# Patient Record
Sex: MALE | Race: ASIAN | URBAN - METROPOLITAN AREA
[De-identification: names, ages, dates, MRNs, and addresses within clinical notes are randomized per-mention and may not be internally consistent; named-entity substitution may affect disease eponyms.]

---

## 2019-11-06 ENCOUNTER — OFFICE VISIT (OUTPATIENT)
Dept: PRIMARY CARE CLINIC | Age: 34
End: 2019-11-06

## 2019-11-06 VITALS
HEART RATE: 65 BPM | DIASTOLIC BLOOD PRESSURE: 100 MMHG | RESPIRATION RATE: 16 BRPM | BODY MASS INDEX: 28.06 KG/M2 | HEIGHT: 67 IN | TEMPERATURE: 98.3 F | SYSTOLIC BLOOD PRESSURE: 158 MMHG | WEIGHT: 178.8 LBS | OXYGEN SATURATION: 99 %

## 2019-11-06 DIAGNOSIS — I10 ESSENTIAL HYPERTENSION: ICD-10-CM

## 2019-11-06 DIAGNOSIS — E78.00 HYPERCHOLESTEREMIA: Primary | ICD-10-CM

## 2019-11-06 RX ORDER — ATORVASTATIN CALCIUM 20 MG/1
20 TABLET, FILM COATED ORAL DAILY
Qty: 90 TAB | Refills: 3 | Status: SHIPPED | OUTPATIENT
Start: 2019-11-06

## 2019-11-06 RX ORDER — ATORVASTATIN CALCIUM 20 MG/1
TABLET, FILM COATED ORAL DAILY
COMMUNITY
End: 2019-11-06 | Stop reason: SDUPTHER

## 2019-11-06 NOTE — PROGRESS NOTES
Jose Alberto Rivera is a 37 y.o. male    Chief Complaint   Patient presents with    [facility]    Medication Refill     atorvastatin- has not taken it in over a month    Cholesterol Problem     not fasting       1. Have you been to the ER, urgent care clinic since your last visit? Hospitalized since your last visit? No    2. Have you seen or consulted any other health care providers outside of the [address] [place] [address] since your last visit? Include any pap smears or colon screening. No    No flowsheet data found.      Health Maintenance Due   Topic Date Due    DTaP/Tdap/Td series (1 - Tdap) 09/01/2004    Influenza Age 5 to Adult  08/01/2019

## 2019-11-06 NOTE — PROGRESS NOTES
Jose Alberto Rivera is a 37 y.o.  male and presents with     Chief Complaint   Patient presents with    Establish Care    Medication Refill     atorvastatin- has not taken it in over a month    Cholesterol Problem     not fasting    Hypertension     Pt is here to establish care. Pt recently moved from [place]. t started taking statins a year ago and was also taking HTN meds . However Blood pressure improved so he stopped taking it. Both parents have HTN and DM. No CAD or stroke. Pt does not smoke or drink alcohol. Pt ran out of statins 2 months back. .  Pt took BP meds for 2 months and stopped it  8 months back. Pt is  and has one child. Aunt had uterine cancer. Past Medical History:   Diagnosis Date    Hypercholesterolemia      History reviewed. No pertinent surgical history. Current Outpatient Medications   Medication Sig    atorvastatin (LIPITOR) 20 mg tablet Take 1 Tab by mouth daily. Indications: has not taken it in over a month 11/6/19-mjb     No current facility-administered medications for this visit. Health Maintenance   Topic Date Due    DTaP/Tdap/Td series (1 - Tdap) 09/01/2004    Influenza Age 5 to Adult  08/01/2019    Pneumococcal 0-64 years  Aged Out       There is no immunization history on file for this patient. No LMP for male patient. Allergies and Intolerances:   No Known Allergies    Family History:   History reviewed. No pertinent family history. Social History:   He  reports that he has never smoked. He has never used smokeless tobacco.  He  reports that he drank alcohol.             Review of Systems:   General: negative for - chills, fatigue, fever, weight change  Psych: negative for - anxiety, depression, irritability or mood swings  ENT: negative for - headaches, hearing change, nasal congestion, oral lesions, sneezing or sore throat  Heme/ Lymph: negative for - bleeding problems, bruising, pallor or swollen lymph nodes  Endo: negative for - hot flashes, polydipsia/polyuria or temperature intolerance  Resp: negative for - cough, shortness of breath or wheezing  CV: negative for - chest pain, edema or palpitations  GI: negative for - abdominal pain, change in bowel habits, constipation, diarrhea or nausea/vomiting  : negative for - dysuria, hematuria, incontinence, pelvic pain or vulvar/vaginal symptoms  MSK: negative for - joint pain, joint swelling or muscle pain  Neuro: negative for - confusion, headaches, seizures or weakness  Derm: negative for - dry skin, hair changes, rash or skin lesion changes          Physical:   Vitals:   Vitals:    11/06/19 1256   BP: 127/89   Pulse: 65   Resp: 16   Temp: 98.3 °F (36.8 °C)   TempSrc: Oral   SpO2: 99%   Weight: 178 lb 12.8 oz (81.1 kg)   Height: 5' 7\" (1.702 m)           Exam:   HEENT- atraumatic,normocephalic, awake, oriented, well nourished  Neck - supple,no enlarged lymph nodes, no JVD, no thyromegaly  Chest- CTA, no rhonchi, no crackles  Heart- rrr, no murmurs / gallop/rub  Abdomen- soft,BS+,NT, no hepatosplenomegaly  Ext - no c/c/edema   Neuro- no focal deficits. Power 5/5 all extremities  Skin - warm,dry, no obvious rashes. Review of Data:   LABS:   No results found for: WBC, HGB, HCT, PLT, HGBEXT, HCTEXT, PLTEXT, HGBEXT, HCTEXT, PLTEXT  No results found for: NA, K, CL, CO2, GLU, BUN, CREA  No results found for: CHOL, CHOLX, CHLST, CHOLV, HDL, HDLP, LDL, LDLC, DLDLP, TGLX, TRIGL, TRIGP  No results found for: GPT        Impression / Plan:        ICD-10-CM ICD-9-CM    1. Hypercholesteremia E78.00 272.0 CBC WITH AUTOMATED DIFF      METABOLIC PANEL, COMPREHENSIVE      LIPID PANEL   2. Essential hypertension I10 401.9      HTN - low salt diet, monitor blood pressure. Explained to patient risk benefits of the medications. Advised patient to stop meds if having any side effects. Pt verbalized understanding of the instructions.     I have discussed the diagnosis with the patient and the intended plan as seen in the above orders. The patient has received an after-visit summary and questions were answered concerning future plans. I have discussed medication side effects and warnings with the patient as well. I have reviewed the plan of care with the patient, accepted their input and they are in agreement with the treatment goals. Reviewed plan of care. Patient has provided input and agrees with goals. Follow-up and Dispositions    · Return in about 6 months (around 5/6/2020).          Kim Lion MD

## 2020-01-09 ENCOUNTER — TELEPHONE (OUTPATIENT)
Dept: PRIMARY CARE CLINIC | Age: 35
End: 2020-01-09

## 2020-01-09 LAB
ALBUMIN SERPL-MCNC: 4.5 G/DL (ref 3.5–5.5)
ALBUMIN/GLOB SERPL: 1.4 {RATIO} (ref 1.2–2.2)
ALP SERPL-CCNC: 98 IU/L (ref 39–117)
ALT SERPL-CCNC: 54 IU/L (ref 0–44)
AST SERPL-CCNC: 30 IU/L (ref 0–40)
BASOPHILS # BLD AUTO: 0.1 X10E3/UL (ref 0–0.2)
BASOPHILS NFR BLD AUTO: 1 %
BILIRUB SERPL-MCNC: 0.6 MG/DL (ref 0–1.2)
BUN SERPL-MCNC: 10 MG/DL (ref 6–20)
BUN/CREAT SERPL: 12 (ref 9–20)
CALCIUM SERPL-MCNC: 9.6 MG/DL (ref 8.7–10.2)
CHLORIDE SERPL-SCNC: 101 MMOL/L (ref 96–106)
CHOLEST SERPL-MCNC: 183 MG/DL (ref 100–199)
CO2 SERPL-SCNC: 26 MMOL/L (ref 20–29)
CREAT SERPL-MCNC: 0.86 MG/DL (ref 0.76–1.27)
EOSINOPHIL # BLD AUTO: 0.2 X10E3/UL (ref 0–0.4)
EOSINOPHIL NFR BLD AUTO: 2 %
ERYTHROCYTE [DISTWIDTH] IN BLOOD BY AUTOMATED COUNT: 12.8 % (ref 11.6–15.4)
GLOBULIN SER CALC-MCNC: 3.2 G/DL (ref 1.5–4.5)
GLUCOSE SERPL-MCNC: 105 MG/DL (ref 65–99)
HCT VFR BLD AUTO: 46.6 % (ref 37.5–51)
HDLC SERPL-MCNC: 48 MG/DL
HGB BLD-MCNC: 15.5 G/DL (ref 13–17.7)
IMM GRANULOCYTES # BLD AUTO: 0.1 X10E3/UL (ref 0–0.1)
IMM GRANULOCYTES NFR BLD AUTO: 1 %
LDLC SERPL CALC-MCNC: 102 MG/DL (ref 0–99)
LYMPHOCYTES # BLD AUTO: 3.9 X10E3/UL (ref 0.7–3.1)
LYMPHOCYTES NFR BLD AUTO: 41 %
MCH RBC QN AUTO: 28.7 PG (ref 26.6–33)
MCHC RBC AUTO-ENTMCNC: 33.3 G/DL (ref 31.5–35.7)
MCV RBC AUTO: 86 FL (ref 79–97)
MONOCYTES # BLD AUTO: 0.6 X10E3/UL (ref 0.1–0.9)
MONOCYTES NFR BLD AUTO: 7 %
NEUTROPHILS # BLD AUTO: 4.6 X10E3/UL (ref 1.4–7)
NEUTROPHILS NFR BLD AUTO: 48 %
PLATELET # BLD AUTO: 301 X10E3/UL (ref 150–450)
POTASSIUM SERPL-SCNC: 4.6 MMOL/L (ref 3.5–5.2)
PROT SERPL-MCNC: 7.7 G/DL (ref 6–8.5)
RBC # BLD AUTO: 5.41 X10E6/UL (ref 4.14–5.8)
SODIUM SERPL-SCNC: 141 MMOL/L (ref 134–144)
TRIGL SERPL-MCNC: 163 MG/DL (ref 0–149)
VLDLC SERPL CALC-MCNC: 33 MG/DL (ref 5–40)
WBC # BLD AUTO: 9.5 X10E3/UL (ref 3.4–10.8)

## 2020-01-09 NOTE — TELEPHONE ENCOUNTER
----- Message from Tyler Shultz MD sent at 1/9/2020 12:10 PM EST -----  Cholesterol is normal. Kidney and liver function is normal.  Blood sugar is slightly elevated , FBG - 105. Watch diet. NO need for chol meds at this time. MOnitor blood pressure .  If elevated RTC

## 2020-01-09 NOTE — PROGRESS NOTES
Cholesterol is normal. Kidney and liver function is normal.  Blood sugar is slightly elevated , FBG - 105. Watch diet. NO need for chol meds at this time. MOnitor blood pressure .  If elevated RTC

## 2020-01-09 NOTE — TELEPHONE ENCOUNTER
Labs conveyed to pt regarding labs per Dr. Morris Rutledge. Pt verbalized  Understanding and no further questions were asked.

## 2022-01-24 ENCOUNTER — OFFICE VISIT (OUTPATIENT)
Dept: PRIMARY CARE CLINIC | Age: 37
End: 2022-01-24
Payer: COMMERCIAL

## 2022-01-24 VITALS
RESPIRATION RATE: 20 BRPM | BODY MASS INDEX: 29.35 KG/M2 | WEIGHT: 187 LBS | HEART RATE: 90 BPM | HEIGHT: 67 IN | DIASTOLIC BLOOD PRESSURE: 99 MMHG | TEMPERATURE: 97.5 F | OXYGEN SATURATION: 100 % | SYSTOLIC BLOOD PRESSURE: 159 MMHG

## 2022-01-24 DIAGNOSIS — Z00.00 ANNUAL PHYSICAL EXAM: Primary | ICD-10-CM

## 2022-01-24 DIAGNOSIS — E78.00 HYPERCHOLESTEREMIA: Primary | ICD-10-CM

## 2022-01-24 DIAGNOSIS — R73.09 ELEVATED RANDOM BLOOD GLUCOSE LEVEL: ICD-10-CM

## 2022-01-24 DIAGNOSIS — I10 PRIMARY HYPERTENSION: ICD-10-CM

## 2022-01-24 LAB
ALBUMIN SERPL-MCNC: 4 G/DL (ref 3.5–5)
ALBUMIN/GLOB SERPL: 1 {RATIO} (ref 1.1–2.2)
ALP SERPL-CCNC: 107 U/L (ref 45–117)
ALT SERPL-CCNC: 72 U/L (ref 12–78)
ANION GAP SERPL CALC-SCNC: 5 MMOL/L (ref 5–15)
AST SERPL-CCNC: 28 U/L (ref 15–37)
BASOPHILS # BLD: 0.1 K/UL (ref 0–0.1)
BASOPHILS NFR BLD: 1 % (ref 0–1)
BILIRUB SERPL-MCNC: 0.3 MG/DL (ref 0.2–1)
BUN SERPL-MCNC: 11 MG/DL (ref 6–20)
BUN/CREAT SERPL: 12 (ref 12–20)
CALCIUM SERPL-MCNC: 9.6 MG/DL (ref 8.5–10.1)
CHLORIDE SERPL-SCNC: 104 MMOL/L (ref 97–108)
CHOLEST SERPL-MCNC: 327 MG/DL
CO2 SERPL-SCNC: 29 MMOL/L (ref 21–32)
CREAT SERPL-MCNC: 0.92 MG/DL (ref 0.7–1.3)
DIFFERENTIAL METHOD BLD: ABNORMAL
EOSINOPHIL # BLD: 0.1 K/UL (ref 0–0.4)
EOSINOPHIL NFR BLD: 2 % (ref 0–7)
ERYTHROCYTE [DISTWIDTH] IN BLOOD BY AUTOMATED COUNT: 12.5 % (ref 11.5–14.5)
EST. AVERAGE GLUCOSE BLD GHB EST-MCNC: 114 MG/DL
GLOBULIN SER CALC-MCNC: 3.9 G/DL (ref 2–4)
GLUCOSE SERPL-MCNC: 99 MG/DL (ref 65–100)
HBA1C MFR BLD: 5.6 % (ref 4–5.6)
HCT VFR BLD AUTO: 46.9 % (ref 36.6–50.3)
HDLC SERPL-MCNC: 54 MG/DL
HDLC SERPL: 6.1 {RATIO} (ref 0–5)
HGB BLD-MCNC: 15.2 G/DL (ref 12.1–17)
IMM GRANULOCYTES # BLD AUTO: 0.1 K/UL (ref 0–0.04)
IMM GRANULOCYTES NFR BLD AUTO: 1 % (ref 0–0.5)
LDLC SERPL CALC-MCNC: 245 MG/DL (ref 0–100)
LYMPHOCYTES # BLD: 2.7 K/UL (ref 0.8–3.5)
LYMPHOCYTES NFR BLD: 31 % (ref 12–49)
MCH RBC QN AUTO: 29 PG (ref 26–34)
MCHC RBC AUTO-ENTMCNC: 32.4 G/DL (ref 30–36.5)
MCV RBC AUTO: 89.5 FL (ref 80–99)
MONOCYTES # BLD: 0.6 K/UL (ref 0–1)
MONOCYTES NFR BLD: 7 % (ref 5–13)
NEUTS SEG # BLD: 5.3 K/UL (ref 1.8–8)
NEUTS SEG NFR BLD: 58 % (ref 32–75)
NRBC # BLD: 0 K/UL (ref 0–0.01)
NRBC BLD-RTO: 0 PER 100 WBC
PLATELET # BLD AUTO: 303 K/UL (ref 150–400)
PMV BLD AUTO: 10.7 FL (ref 8.9–12.9)
POTASSIUM SERPL-SCNC: 4.9 MMOL/L (ref 3.5–5.1)
PROT SERPL-MCNC: 7.9 G/DL (ref 6.4–8.2)
RBC # BLD AUTO: 5.24 M/UL (ref 4.1–5.7)
SODIUM SERPL-SCNC: 138 MMOL/L (ref 136–145)
TRIGL SERPL-MCNC: 140 MG/DL (ref ?–150)
VLDLC SERPL CALC-MCNC: 28 MG/DL
WBC # BLD AUTO: 8.8 K/UL (ref 4.1–11.1)

## 2022-01-24 PROCEDURE — 99395 PREV VISIT EST AGE 18-39: CPT | Performed by: INTERNAL MEDICINE

## 2022-01-24 RX ORDER — VALSARTAN 80 MG/1
80 TABLET ORAL DAILY
Qty: 90 TABLET | Refills: 1 | Status: SHIPPED | OUTPATIENT
Start: 2022-01-24 | End: 2022-05-18 | Stop reason: DRUGHIGH

## 2022-01-24 RX ORDER — ATORVASTATIN CALCIUM 20 MG/1
20 TABLET, FILM COATED ORAL DAILY
Qty: 90 TABLET | Refills: 1 | Status: SHIPPED | OUTPATIENT
Start: 2022-01-24 | End: 2022-07-18

## 2022-01-24 NOTE — PROGRESS NOTES
Keli Gloria is a 43 y.o.  male and presents with     Chief Complaint   Patient presents with    Physical     fasting for labs     Pt is hre for a physical. Pt has gained about 9 pounds sicne previosu visit. Pt took statins for few months in the past and stopped it as his repeat levels were normal.  Pt has pos FH for HTN,DM and elevated chol -  both mother and father  Pt does not smoke or drink alcohol. Pt gets blood pressure readings around 130/90 when he checks it  No FH for CAD or stroke. Past Medical History:   Diagnosis Date    Hypercholesterolemia      No past surgical history on file. Current Outpatient Medications   Medication Sig    valsartan (DIOVAN) 80 mg tablet Take 1 Tablet by mouth daily.  atorvastatin (LIPITOR) 20 mg tablet Take 1 Tab by mouth daily. Indications: has not taken it in over a month 11/6/19-mjb (Patient not taking: Reported on 1/24/2022)     No current facility-administered medications for this visit. Health Maintenance   Topic Date Due    Lipid Screen  01/08/2021    Flu Vaccine (1) Never done    DTaP/Tdap/Td series (1 - Tdap) 01/24/2023 (Originally 9/1/2004)    COVID-19 Vaccine  Completed    Pneumococcal 0-64 years  Aged Out    Hepatitis C Screening  Discontinued     Immunization History   Administered Date(s) Administered    COVID-19, Moderna Booster, PF, 0.25mL Dose 01/04/2022    COVID-19, [place] Batch, Primary or Immunocompromised Series, MRNA, PF, 100mcg/0.5mL 04/28/2021, 05/20/2021     No LMP for male patient. Allergies and Intolerances:   No Known Allergies    Family History:   No family history on file. Social History:   He  reports that he has never smoked. He has never used smokeless tobacco.  He  reports previous alcohol use.             Review of Systems:   General: negative for - chills, fatigue, fever, weight change  Psych: negative for - anxiety, depression, irritability or mood swings  ENT: negative for - headaches, hearing change, nasal congestion, oral lesions, sneezing or sore throat  Heme/ Lymph: negative for - bleeding problems, bruising, pallor or swollen lymph nodes  Endo: negative for - hot flashes, polydipsia/polyuria or temperature intolerance  Resp: negative for - cough, shortness of breath or wheezing  CV: negative for - chest pain, edema or palpitations  GI: negative for - abdominal pain, change in bowel habits, constipation, diarrhea or nausea/vomiting  : negative for - dysuria, hematuria, incontinence, pelvic pain or vulvar/vaginal symptoms  MSK: negative for - joint pain, joint swelling or muscle pain  Neuro: negative for - confusion, headaches, seizures or weakness  Derm: negative for - dry skin, hair changes, rash or skin lesion changes          Physical:   Vitals:   Vitals:    01/24/22 1031 01/24/22 1044 01/24/22 1058   BP: (!) 153/85 129/80 (!) 159/99   Pulse: 78 90    Resp: 20     Temp: 97.5 °F (36.4 °C)     TempSrc: Temporal     SpO2: 100%     Weight: 187 lb (84.8 kg)     Height: 5' 7\" (1.702 m)             Exam:   HEENT- atraumatic,normocephalic, awake, oriented, well nourished  Neck - supple,no enlarged lymph nodes, no JVD, no thyromegaly  Chest- CTA, no rhonchi, no crackles  Heart- rrr, no murmurs / gallop/rub  Abdomen- soft,BS+,NT, no hepatosplenomegaly  Ext - no c/c/edema   Neuro- no focal deficits. Power 5/5 all extremities  Skin - warm,dry, no obvious rashes.           Review of Data:   LABS:   Lab Results   Component Value Date/Time    WBC 9.5 01/08/2020 11:37 AM    HGB 15.5 01/08/2020 11:37 AM    HCT 46.6 01/08/2020 11:37 AM    PLATELET 272 56/75/8976 11:37 AM     Lab Results   Component Value Date/Time    Sodium 141 01/08/2020 11:37 AM    Potassium 4.6 01/08/2020 11:37 AM    Chloride 101 01/08/2020 11:37 AM    CO2 26 01/08/2020 11:37 AM    Glucose 105 (H) 01/08/2020 11:37 AM    BUN 10 01/08/2020 11:37 AM    Creatinine 0.86 01/08/2020 11:37 AM     Lab Results   Component Value Date/Time    Cholesterol, total 183 01/08/2020 11:37 AM    HDL Cholesterol 48 01/08/2020 11:37 AM    LDL, calculated 102 (H) 01/08/2020 11:37 AM    Triglyceride 163 (H) 01/08/2020 11:37 AM     No components found for: GPT        Impression / Plan:        ICD-10-CM ICD-9-CM    1. Annual physical exam  Z00.00 V70.0 HEMOGLOBIN A1C WITH EAG      LIPID PANEL      METABOLIC PANEL, COMPREHENSIVE      CBC WITH AUTOMATED DIFF      CBC WITH AUTOMATED DIFF      METABOLIC PANEL, COMPREHENSIVE      LIPID PANEL      HEMOGLOBIN A1C WITH EAG   2. Elevated random blood glucose level  R73.09 790.29 HEMOGLOBIN A1C WITH EAG      HEMOGLOBIN A1C WITH EAG   3. Primary hypertension  I10 401.9 valsartan (DIOVAN) 80 mg tablet     Advised low-salt diet, monitor blood pressure. If cholesterol is elevated, will send a prescription. Explained to patient risk benefits of the medications. Advised patient to stop meds if having any side effects. Pt verbalized understanding of the instructions. I have discussed the diagnosis with the patient and the intended plan as seen in the above orders. The patient has received an after-visit summary and questions were answered concerning future plans. I have discussed medication side effects and warnings with the patient as well. I have reviewed the plan of care with the patient, accepted their input and they are in agreement with the treatment goals. Reviewed plan of care. Patient has provided input and agrees with goals. Follow-up and Dispositions    · Return in about 6 months (around 7/24/2022).          Jia Martínez MD

## 2022-01-24 NOTE — PROGRESS NOTES
Chief Complaint   Patient presents with    Physical     fasting for labs        Visit Vitals  BP (!) 153/85 (BP 1 Location: Left upper arm, BP Patient Position: Sitting)   Pulse 78   Temp 97.5 °F (36.4 °C) (Temporal)   Resp 20   Ht 5' 7\" (1.702 m)   Wt 187 lb (84.8 kg)   SpO2 100%   BMI 29.29 kg/m²        1. Have you been to the ER, urgent care clinic since your last visit? Hospitalized since your last visit? No    2. Have you seen or consulted any other health care providers outside of the [address] [place] [address] since your last visit? Include any pap smears or colon screening.  No

## 2022-04-22 ENCOUNTER — TELEPHONE (OUTPATIENT)
Dept: PRIMARY CARE CLINIC | Age: 37
End: 2022-04-22

## 2022-04-22 NOTE — TELEPHONE ENCOUNTER
Spoke to patient and iformed he will need to be seen for something like this first. Informed to go to urgent care to be evaluated

## 2022-05-18 ENCOUNTER — OFFICE VISIT (OUTPATIENT)
Dept: PRIMARY CARE CLINIC | Age: 37
End: 2022-05-18
Payer: COMMERCIAL

## 2022-05-18 VITALS
WEIGHT: 188.4 LBS | SYSTOLIC BLOOD PRESSURE: 156 MMHG | OXYGEN SATURATION: 100 % | BODY MASS INDEX: 29.57 KG/M2 | HEIGHT: 67 IN | RESPIRATION RATE: 18 BRPM | HEART RATE: 94 BPM | TEMPERATURE: 97.5 F | DIASTOLIC BLOOD PRESSURE: 100 MMHG

## 2022-05-18 DIAGNOSIS — E78.00 HYPERCHOLESTEREMIA: ICD-10-CM

## 2022-05-18 DIAGNOSIS — I10 PRIMARY HYPERTENSION: Primary | ICD-10-CM

## 2022-05-18 DIAGNOSIS — I10 PRIMARY HYPERTENSION: ICD-10-CM

## 2022-05-18 LAB
ALBUMIN SERPL-MCNC: 4.1 G/DL (ref 3.5–5)
ALBUMIN/GLOB SERPL: 1 {RATIO} (ref 1.1–2.2)
ALP SERPL-CCNC: 127 U/L (ref 45–117)
ALT SERPL-CCNC: 97 U/L (ref 12–78)
ANION GAP SERPL CALC-SCNC: 4 MMOL/L (ref 5–15)
AST SERPL-CCNC: 34 U/L (ref 15–37)
BILIRUB SERPL-MCNC: 0.6 MG/DL (ref 0.2–1)
BUN SERPL-MCNC: 9 MG/DL (ref 6–20)
BUN/CREAT SERPL: 10 (ref 12–20)
CALCIUM SERPL-MCNC: 9.5 MG/DL (ref 8.5–10.1)
CHLORIDE SERPL-SCNC: 102 MMOL/L (ref 97–108)
CHOLEST SERPL-MCNC: 176 MG/DL
CO2 SERPL-SCNC: 30 MMOL/L (ref 21–32)
CREAT SERPL-MCNC: 0.94 MG/DL (ref 0.7–1.3)
GLOBULIN SER CALC-MCNC: 4 G/DL (ref 2–4)
GLUCOSE SERPL-MCNC: 95 MG/DL (ref 65–100)
HDLC SERPL-MCNC: 49 MG/DL
HDLC SERPL: 3.6 {RATIO} (ref 0–5)
LDLC SERPL CALC-MCNC: 94.2 MG/DL (ref 0–100)
POTASSIUM SERPL-SCNC: 4.3 MMOL/L (ref 3.5–5.1)
PROT SERPL-MCNC: 8.1 G/DL (ref 6.4–8.2)
SODIUM SERPL-SCNC: 136 MMOL/L (ref 136–145)
TRIGL SERPL-MCNC: 164 MG/DL (ref ?–150)
VLDLC SERPL CALC-MCNC: 32.8 MG/DL

## 2022-05-18 PROCEDURE — 99213 OFFICE O/P EST LOW 20 MIN: CPT | Performed by: INTERNAL MEDICINE

## 2022-05-18 RX ORDER — VALSARTAN 160 MG/1
160 TABLET ORAL DAILY
Qty: 90 TABLET | Refills: 2 | Status: SHIPPED | OUTPATIENT
Start: 2022-05-18

## 2022-05-18 NOTE — PROGRESS NOTES
Wes Contreras is a 43 y.o.  male and presents with     Chief Complaint   Patient presents with    Hypertension     Patient is here for a follow-up visit. He has been monitoring his blood pressure and occasionally his systolic blood pressure goes up to 140 or 150. He is willing to take a higher dose of blood pressure medicine. He is taking cholesterol medicine and denies any abdominal pain nausea vomiting or jaundice. He wants to take to check labs to see if his cholesterol is improved      Past Medical History:   Diagnosis Date    Hypercholesterolemia      History reviewed. No pertinent surgical history. Current Outpatient Medications   Medication Sig    valsartan (DIOVAN) 160 mg tablet Take 1 Tablet by mouth daily.  atorvastatin (LIPITOR) 20 mg tablet Take 1 Tablet by mouth daily.  atorvastatin (LIPITOR) 20 mg tablet Take 1 Tab by mouth daily. Indications: has not taken it in over a month 11/6/19-mjb (Patient not taking: Reported on 1/24/2022)     No current facility-administered medications for this visit. Health Maintenance   Topic Date Due    DTaP/Tdap/Td series (1 - Tdap) 01/24/2023 (Originally 9/1/2004)    Flu Vaccine (Season Ended) 09/01/2022    Lipid Screen  01/24/2023    Depression Screen  05/18/2023    COVID-19 Vaccine  Completed    Pneumococcal 0-64 years  Aged Out    Hepatitis C Screening  Discontinued     Immunization History   Administered Date(s) Administered    COVID-19, Moderna Booster, PF, 0.25mL Dose 01/04/2022    COVID-19, Melqui[place], Primary or Immunocompromised Series, MRNA, PF, 100mcg/0.5mL 04/28/2021, 05/20/2021     No LMP for male patient. Allergies and Intolerances:   No Known Allergies    Family History:   Family History   Family history unknown: Yes       Social History:   He  reports that he has never smoked. He has never used smokeless tobacco.  He  reports previous alcohol use.             Review of Systems:   General: negative for - chills, fatigue, fever, weight change  Psych: negative for - anxiety, depression, irritability or mood swings  ENT: negative for - headaches, hearing change, nasal congestion, oral lesions, sneezing or sore throat  Heme/ Lymph: negative for - bleeding problems, bruising, pallor or swollen lymph nodes  Endo: negative for - hot flashes, polydipsia/polyuria or temperature intolerance  Resp: negative for - cough, shortness of breath or wheezing  CV: negative for - chest pain, edema or palpitations  GI: negative for - abdominal pain, change in bowel habits, constipation, diarrhea or nausea/vomiting  : negative for - dysuria, hematuria, incontinence, pelvic pain or vulvar/vaginal symptoms  MSK: negative for - joint pain, joint swelling or muscle pain  Neuro: negative for - confusion, headaches, seizures or weakness  Derm: negative for - dry skin, hair changes, rash or skin lesion changes          Physical:   Vitals:   Vitals:    05/18/22 1532   BP: (!) 156/100   Pulse: 94   Resp: 18   Temp: 97.5 °F (36.4 °C)   TempSrc: Temporal   SpO2: 100%   Weight: 188 lb 6.4 oz (85.5 kg)   Height: 5' 7\" (1.702 m)           Exam:   HEENT- atraumatic,normocephalic, awake, oriented, well nourished  Neck - supple,no enlarged lymph nodes, no JVD, no thyromegaly  Chest- CTA, no rhonchi, no crackles  Heart- rrr, no murmurs / gallop/rub  Abdomen- soft,BS+,NT, no hepatosplenomegaly  Ext - no c/c/edema   Neuro- no focal deficits. Power 5/5 all extremities  Skin - warm,dry, no obvious rashes.           Review of Data:   LABS:   Lab Results   Component Value Date/Time    WBC 8.8 01/24/2022 11:23 AM    HGB 15.2 01/24/2022 11:23 AM    HCT 46.9 01/24/2022 11:23 AM    PLATELET 054 22/22/6300 11:23 AM     Lab Results   Component Value Date/Time    Sodium 138 01/24/2022 11:23 AM    Potassium 4.9 01/24/2022 11:23 AM    Chloride 104 01/24/2022 11:23 AM    CO2 29 01/24/2022 11:23 AM    Glucose 99 01/24/2022 11:23 AM    BUN 11 01/24/2022 11:23 AM    Creatinine 0.92 01/24/2022 11:23 AM     Lab Results   Component Value Date/Time    Cholesterol, total 327 (H) 01/24/2022 11:23 AM    HDL Cholesterol 54 01/24/2022 11:23 AM    LDL, calculated 245 (H) 01/24/2022 11:23 AM    Triglyceride 140 01/24/2022 11:23 AM     No components found for: GPT        Impression / Plan:        ICD-10-CM ICD-9-CM    1. Primary hypertension  I10 401.9 valsartan (DIOVAN) 160 mg tablet      METABOLIC PANEL, COMPREHENSIVE      CANCELED: METABOLIC PANEL, COMPREHENSIVE   2. Hypercholesteremia  E78.00 272.0 LIPID PANEL      CANCELED: LIPID PANEL         Explained to patient risk benefits of the medications. Advised patient to stop meds if having any side effects. Pt verbalized understanding of the instructions. I have discussed the diagnosis with the patient and the intended plan as seen in the above orders. The patient has received an after-visit summary and questions were answered concerning future plans. I have discussed medication side effects and warnings with the patient as well. I have reviewed the plan of care with the patient, accepted their input and they are in agreement with the treatment goals. Reviewed plan of care. Patient has provided input and agrees with goals. Follow-up and Dispositions    · Return in about 8 months (around 1/18/2023).          Kathryn Sutherland MD

## 2022-05-18 NOTE — PROGRESS NOTES
Identified pt with two pt identifiers(name and ). Chief Complaint   Patient presents with    Hypertension        3 most recent PHQ Screens 2022   Little interest or pleasure in doing things Not at all   Feeling down, depressed, irritable, or hopeless Not at all   Total Score PHQ 2 0        Vitals:    22 1532   BP: 137/87   Pulse: 94   Resp: 18   Temp: 97.5 °F (36.4 °C)   TempSrc: Temporal   SpO2: 100%   Weight: 188 lb 6.4 oz (85.5 kg)   Height: 5' 7\" (1.702 m)       There are no preventive care reminders to display for this patient. 1. Have you been to the ER, urgent care clinic since your last visit? Hospitalized since your last visit? No    2. Have you seen or consulted any other health care providers outside of the [address] [place] [address] since your last visit? Include any pap smears or colon screening. No    3. For patients aged 39-70: Has the patient had a colonoscopy / FIT/ Cologuard? NA - based on age      If the patient is female:    4. For patients aged 41-77: Has the patient had a mammogram within the past 2 years? NA - based on age or sex      11. For patients aged 21-65: Has the patient had a pap smear?  NA - based on age or sex

## 2022-05-20 NOTE — PROGRESS NOTES
Cholesterol is improved. Kidney function is normal  Liver enzymes show slight elevation. Could be related to cholesterol medication.   No concerns

## 2022-07-18 DIAGNOSIS — E78.00 HYPERCHOLESTEREMIA: ICD-10-CM

## 2022-07-18 RX ORDER — ATORVASTATIN CALCIUM 20 MG/1
20 TABLET, FILM COATED ORAL DAILY
Qty: 90 TABLET | Refills: 1 | Status: SHIPPED | OUTPATIENT
Start: 2022-07-18

## 2023-05-18 RX ORDER — ATORVASTATIN CALCIUM 20 MG/1
20 TABLET, FILM COATED ORAL DAILY
COMMUNITY
Start: 2019-11-06

## 2023-05-18 RX ORDER — VALSARTAN 160 MG/1
160 TABLET ORAL DAILY
COMMUNITY
Start: 2023-02-12

## 2023-05-23 RX ORDER — ATORVASTATIN CALCIUM 20 MG/1
20 TABLET, FILM COATED ORAL DAILY
Qty: 30 TABLET | OUTPATIENT
Start: 2023-05-23

## 2023-05-23 RX ORDER — VALSARTAN 160 MG/1
160 TABLET ORAL DAILY
Qty: 90 TABLET | OUTPATIENT
Start: 2023-05-23